# Patient Record
Sex: FEMALE | NOT HISPANIC OR LATINO | Employment: FULL TIME | URBAN - METROPOLITAN AREA
[De-identification: names, ages, dates, MRNs, and addresses within clinical notes are randomized per-mention and may not be internally consistent; named-entity substitution may affect disease eponyms.]

---

## 2019-10-01 ENCOUNTER — NON-PROVIDER VISIT (OUTPATIENT)
Dept: URGENT CARE | Facility: PHYSICIAN GROUP | Age: 19
End: 2019-10-01

## 2019-10-01 DIAGNOSIS — Z02.1 PRE-EMPLOYMENT DRUG SCREENING: ICD-10-CM

## 2019-10-01 LAB
AMP AMPHETAMINE: NORMAL
COC COCAINE: NORMAL
INT CON NEG: NORMAL
INT CON POS: NORMAL
MET METHAMPHETAMINES: NORMAL
OPI OPIATES: NORMAL
PCP PHENCYCLIDINE: NORMAL
POC DRUG COMMENT 753798-OCCUPATIONAL HEALTH: NEGATIVE
THC: NORMAL

## 2019-10-01 PROCEDURE — 80305 DRUG TEST PRSMV DIR OPT OBS: CPT | Performed by: FAMILY MEDICINE

## 2020-03-11 ENCOUNTER — HOSPITAL ENCOUNTER (EMERGENCY)
Facility: MEDICAL CENTER | Age: 20
End: 2020-03-11
Attending: EMERGENCY MEDICINE
Payer: MEDICAID

## 2020-03-11 VITALS
TEMPERATURE: 98.9 F | HEIGHT: 66 IN | WEIGHT: 209.44 LBS | HEART RATE: 92 BPM | RESPIRATION RATE: 16 BRPM | SYSTOLIC BLOOD PRESSURE: 112 MMHG | OXYGEN SATURATION: 100 % | DIASTOLIC BLOOD PRESSURE: 69 MMHG | BODY MASS INDEX: 33.66 KG/M2

## 2020-03-11 DIAGNOSIS — J06.9 UPPER RESPIRATORY TRACT INFECTION, UNSPECIFIED TYPE: ICD-10-CM

## 2020-03-11 PROCEDURE — 99282 EMERGENCY DEPT VISIT SF MDM: CPT

## 2020-03-11 ASSESSMENT — LIFESTYLE VARIABLES: DO YOU DRINK ALCOHOL: YES

## 2020-03-11 NOTE — ED NOTES
Discharge instructions and work note given to pt. All questions answered and pt verbalized understanding.

## 2020-03-11 NOTE — ED TRIAGE NOTES
Pt amb to triage. Mask applied. RN utilizing mask and eye protection.  Chief Complaint   Patient presents with   • Cough     x2d   • N/V     [id number]

## 2020-03-11 NOTE — ED PROVIDER NOTES
"ED Provider Note      CHIEF COMPLAINT  Chief Complaint   Patient presents with   • Cough     x2d   • N/V     [id number]       HPI  Bhupinder Bledsoe is a 20 y.o. female who presents with cough, congestion, runny nose. Symptoms started 2 days ago.  No fever or sore throat.  Felt nauseated this morning.  Vomited one time.  No dysuria hematuria frequency.  No abdominal pain.  No rash.  No headache neck stiffness or fevers.  Similar symptoms in the past from allergies.    No travel out of [facility].  No known ill exposure.    REVIEW OF SYSTEMS  See HPI    PAST MEDICAL HISTORY  Past Medical History:   Diagnosis Date   • ADHD (attention deficit hyperactivity disorder)    • Bipolar 1 disorder (HCC)        FAMILY HISTORY  No family history on file.    SOCIAL HISTORY  Social History     Tobacco Use   • Smoking status: Never Smoker   Substance Use Topics   • Alcohol use: No   • Drug use: No       SURGICAL HISTORY  Past Surgical History:   Procedure Laterality Date   • TURBINATE REDUCTION  1/28/2011    Performed by PERRY DUARTE at [facility]   • TONSILLECTOMY AND ADENOIDECTOMY  2/13/2009    Performed by PERRY DUARTE at [facility]   • MYRINGOTOMY     • PB REMOVAL OF TONSILS,<12 Y/O         CURRENT MEDICATIONS  Home Medications     Reviewed by Temi Buitrago R.N. (Registered Nurse) on 03/11/20 at 1138  Med List Status: Partial   Medication Last Dose Status   CLONIDINE HCL PO  Active   fluoxetine (PROZAC) 40 MG capsule  Active   lamoTRIgine (LAMICTAL PO)  Active   methylphenidate (RITALIN) 5 MG TABS  Active   paliperidone (INVEGA) 3 MG ER tablet not taking Active                ALLERGIES  No Known Allergies    PHYSICAL EXAM  VITAL SIGNS: /69   Pulse 92   Temp 37.2 °C (98.9 °F) (Temporal)   Resp 16   Ht 1.676 m (5' 6\")   Wt 95 kg (209 lb 7 oz)   LMP 02/17/2020   SpO2 100%   BMI 33.80 kg/m²   Constitutional :  No acute distress, Non-toxic appearance.   HENT: " Head atraumatic, nares clear, pharynx normal, tube in the left tympanic membrane without evidence of infection.  Right tympanic membrane with scars but no evidence of infection.  Eyes: Normal inspection, no discharge, no injection  Neck: Normal range of motion, No tenderness, Supple, No stridor.   Lymphatic: no lymphadenopathy.   Cardiovascular: Normal heart rate, Normal rhythm, No murmurs   Thorax & Lungs: Lungs are clear to auscultation bilaterally without wheezes, rales, rhonchi  Skin: Warm, Dry, No erythema, No rash.   Extremities: No edema, No tenderness, No cyanosis, No clubbing.       COURSE & MEDICAL DECISION MAKING  Patient presents with URI symptoms.  Advised symptomatic care.  Thinks possibly related to allergies and have advised Zyrtec.  Can return to work when her symptoms resolved.  Return to ER for difficulty breathing, worsening, not improving or concern.    FINAL IMPRESSION  1.  Upper respiratory infection    This dictation was created using voice recognition software. The accuracy of the dictation is limited to the abilities of the software.   the nursing notes were reviewed and certain aspects of this information were incorporated into this note.      Electronically signed by: Tony Hinton M.D., 3/11/2020

## 2020-04-07 ENCOUNTER — HOSPITAL ENCOUNTER (EMERGENCY)
Dept: HOSPITAL 8 - ED | Age: 20
End: 2020-04-07
Payer: MEDICAID

## 2020-04-07 VITALS — WEIGHT: 205.03 LBS | HEIGHT: 66 IN | BODY MASS INDEX: 32.95 KG/M2

## 2020-04-07 VITALS — SYSTOLIC BLOOD PRESSURE: 131 MMHG | DIASTOLIC BLOOD PRESSURE: 75 MMHG

## 2020-04-07 DIAGNOSIS — J06.9: Primary | ICD-10-CM

## 2020-04-07 PROCEDURE — 99283 EMERGENCY DEPT VISIT LOW MDM: CPT

## 2020-06-17 ENCOUNTER — HOSPITAL ENCOUNTER (EMERGENCY)
Dept: HOSPITAL 8 - ED | Age: 20
Discharge: HOME | End: 2020-06-17
Payer: MEDICAID

## 2020-06-17 VITALS — SYSTOLIC BLOOD PRESSURE: 109 MMHG | DIASTOLIC BLOOD PRESSURE: 59 MMHG

## 2020-06-17 VITALS — BODY MASS INDEX: 34.65 KG/M2 | HEIGHT: 66 IN | WEIGHT: 215.61 LBS

## 2020-06-17 DIAGNOSIS — R10.84: Primary | ICD-10-CM

## 2020-06-17 PROCEDURE — 99281 EMR DPT VST MAYX REQ PHY/QHP: CPT

## 2020-10-10 ENCOUNTER — HOSPITAL ENCOUNTER (EMERGENCY)
Dept: HOSPITAL 8 - ED | Age: 20
Discharge: HOME | End: 2020-10-10
Payer: MEDICAID

## 2020-10-10 VITALS — DIASTOLIC BLOOD PRESSURE: 61 MMHG | SYSTOLIC BLOOD PRESSURE: 111 MMHG

## 2020-10-10 VITALS — HEIGHT: 66 IN | BODY MASS INDEX: 33.16 KG/M2 | WEIGHT: 206.35 LBS

## 2020-10-10 DIAGNOSIS — Z3A.01: ICD-10-CM

## 2020-10-10 DIAGNOSIS — R10.30: ICD-10-CM

## 2020-10-10 DIAGNOSIS — O23.11: Primary | ICD-10-CM

## 2020-10-10 LAB
ALBUMIN SERPL-MCNC: 3.6 G/DL (ref 3.4–5)
ALP SERPL-CCNC: 62 U/L (ref 45–117)
ALT SERPL-CCNC: 19 U/L (ref 12–78)
ANION GAP SERPL CALC-SCNC: 4 MMOL/L (ref 5–15)
BASOPHILS # BLD AUTO: 0.1 X10^3/UL (ref 0–0.1)
BASOPHILS NFR BLD AUTO: 1 % (ref 0–1)
BILIRUB SERPL-MCNC: 0.3 MG/DL (ref 0.2–1)
CALCIUM SERPL-MCNC: 8.7 MG/DL (ref 8.5–10.1)
CHLORIDE SERPL-SCNC: 108 MMOL/L (ref 98–107)
CREAT SERPL-MCNC: 0.73 MG/DL (ref 0.55–1.02)
EOSINOPHIL # BLD AUTO: 0.1 X10^3/UL (ref 0–0.4)
EOSINOPHIL NFR BLD AUTO: 1 % (ref 1–7)
ERYTHROCYTE [DISTWIDTH] IN BLOOD BY AUTOMATED COUNT: 13 % (ref 9.6–15.2)
LYMPHOCYTES # BLD AUTO: 2.6 X10^3/UL (ref 1–3.4)
LYMPHOCYTES NFR BLD AUTO: 28 % (ref 22–44)
MCH RBC QN AUTO: 28.6 PG (ref 27–34.8)
MCHC RBC AUTO-ENTMCNC: 33.8 G/DL (ref 32.4–35.8)
MD: NO
MICROSCOPIC: (no result)
MONOCYTES # BLD AUTO: 0.6 X10^3/UL (ref 0.2–0.8)
MONOCYTES NFR BLD AUTO: 7 % (ref 2–9)
NEUTROPHILS # BLD AUTO: 6.1 X10^3/UL (ref 1.8–6.8)
NEUTROPHILS NFR BLD AUTO: 64 % (ref 42–75)
PLATELET # BLD AUTO: 275 X10^3/UL (ref 130–400)
PMV BLD AUTO: 9.2 FL (ref 7.4–10.4)
PROT SERPL-MCNC: 7.4 G/DL (ref 6.4–8.2)
RBC # BLD AUTO: 5.19 X10^6/UL (ref 3.82–5.3)

## 2020-10-10 PROCEDURE — 80053 COMPREHEN METABOLIC PANEL: CPT

## 2020-10-10 PROCEDURE — 84702 CHORIONIC GONADOTROPIN TEST: CPT

## 2020-10-10 PROCEDURE — 99284 EMERGENCY DEPT VISIT MOD MDM: CPT

## 2020-10-10 PROCEDURE — 85025 COMPLETE CBC W/AUTO DIFF WBC: CPT

## 2020-10-10 PROCEDURE — 36415 COLL VENOUS BLD VENIPUNCTURE: CPT

## 2020-10-10 PROCEDURE — 81001 URINALYSIS AUTO W/SCOPE: CPT

## 2020-10-10 PROCEDURE — 87086 URINE CULTURE/COLONY COUNT: CPT

## 2020-10-10 PROCEDURE — 76801 OB US < 14 WKS SINGLE FETUS: CPT

## 2020-10-16 ENCOUNTER — HOSPITAL ENCOUNTER (EMERGENCY)
Dept: HOSPITAL 8 - ED | Age: 20
Discharge: HOME | End: 2020-10-16
Payer: MEDICAID

## 2020-10-16 VITALS — BODY MASS INDEX: 31.89 KG/M2 | HEIGHT: 66 IN | WEIGHT: 198.42 LBS

## 2020-10-16 VITALS — SYSTOLIC BLOOD PRESSURE: 109 MMHG | DIASTOLIC BLOOD PRESSURE: 61 MMHG

## 2020-10-16 DIAGNOSIS — O21.1: Primary | ICD-10-CM

## 2020-10-16 DIAGNOSIS — R19.7: ICD-10-CM

## 2020-10-16 DIAGNOSIS — Z3A.01: ICD-10-CM

## 2020-10-16 LAB
ALBUMIN SERPL-MCNC: 3.9 G/DL (ref 3.4–5)
ALP SERPL-CCNC: 64 U/L (ref 45–117)
ALT SERPL-CCNC: 24 U/L (ref 12–78)
ANION GAP SERPL CALC-SCNC: 9 MMOL/L (ref 5–15)
BASOPHILS # BLD AUTO: 0 X10^3/UL (ref 0–0.1)
BASOPHILS NFR BLD AUTO: 1 % (ref 0–1)
BILIRUB SERPL-MCNC: 0.9 MG/DL (ref 0.2–1)
CALCIUM SERPL-MCNC: 9.3 MG/DL (ref 8.5–10.1)
CHLORIDE SERPL-SCNC: 108 MMOL/L (ref 98–107)
CREAT SERPL-MCNC: 0.75 MG/DL (ref 0.55–1.02)
EOSINOPHIL # BLD AUTO: 0 X10^3/UL (ref 0–0.4)
EOSINOPHIL NFR BLD AUTO: 1 % (ref 1–7)
ERYTHROCYTE [DISTWIDTH] IN BLOOD BY AUTOMATED COUNT: 13 % (ref 9.6–15.2)
LYMPHOCYTES # BLD AUTO: 2.5 X10^3/UL (ref 1–3.4)
LYMPHOCYTES NFR BLD AUTO: 28 % (ref 22–44)
MCH RBC QN AUTO: 28.5 PG (ref 27–34.8)
MCHC RBC AUTO-ENTMCNC: 34.3 G/DL (ref 32.4–35.8)
MD: NO
MONOCYTES # BLD AUTO: 0.5 X10^3/UL (ref 0.2–0.8)
MONOCYTES NFR BLD AUTO: 5 % (ref 2–9)
NEUTROPHILS # BLD AUTO: 6 X10^3/UL (ref 1.8–6.8)
NEUTROPHILS NFR BLD AUTO: 66 % (ref 42–75)
PLATELET # BLD AUTO: 284 X10^3/UL (ref 130–400)
PMV BLD AUTO: 9.7 FL (ref 7.4–10.4)
PROT SERPL-MCNC: 8.1 G/DL (ref 6.4–8.2)
RBC # BLD AUTO: 5.73 X10^6/UL (ref 3.82–5.3)

## 2020-10-16 PROCEDURE — 80053 COMPREHEN METABOLIC PANEL: CPT

## 2020-10-16 PROCEDURE — 99283 EMERGENCY DEPT VISIT LOW MDM: CPT

## 2020-10-16 PROCEDURE — 85025 COMPLETE CBC W/AUTO DIFF WBC: CPT

## 2020-10-16 PROCEDURE — 96360 HYDRATION IV INFUSION INIT: CPT

## 2020-10-16 PROCEDURE — 96372 THER/PROPH/DIAG INJ SC/IM: CPT

## 2020-10-16 PROCEDURE — 36415 COLL VENOUS BLD VENIPUNCTURE: CPT

## 2020-10-16 PROCEDURE — 83690 ASSAY OF LIPASE: CPT

## 2020-10-16 NOTE — NUR
PT CAME IN CO OF VOMITTING AND NAUSEA X 2-3 DAYS. PT STATES SHE IS ABOUT 6 
WEEKS PREGANT. DENIES VB OR DISCHAGRE OF ABD PAIN. UA HAS BEEN PROVIDED. PT IS 
RESTING IN [facility]. ACCMPANIED BY BOYFRIEND.

## 2023-04-21 ENCOUNTER — OCCUPATIONAL MEDICINE (OUTPATIENT)
Dept: URGENT CARE | Facility: CLINIC | Age: 23
End: 2023-04-21
Payer: COMMERCIAL

## 2023-04-21 VITALS
HEART RATE: 87 BPM | WEIGHT: 204.5 LBS | BODY MASS INDEX: 32.87 KG/M2 | HEIGHT: 66 IN | DIASTOLIC BLOOD PRESSURE: 72 MMHG | OXYGEN SATURATION: 99 % | SYSTOLIC BLOOD PRESSURE: 112 MMHG | TEMPERATURE: 98.3 F | RESPIRATION RATE: 16 BRPM

## 2023-04-21 DIAGNOSIS — S63.501D SPRAIN OF RIGHT WRIST, SUBSEQUENT ENCOUNTER: ICD-10-CM

## 2023-04-21 PROCEDURE — 99203 OFFICE O/P NEW LOW 30 MIN: CPT | Performed by: NURSE PRACTITIONER

## 2023-04-21 ASSESSMENT — ENCOUNTER SYMPTOMS
SENSORY CHANGE: 0
WEAKNESS: 0

## 2023-04-21 NOTE — PATIENT INSTRUCTIONS
-NSAID's (ibuprofen) and tylenol as directed for pain and inflammation.   -RICE Therapy: Rest, Ice, Compression, Elevation  -Gentle range of motion exercises.  -Wear right wrist splint as needed.   -Follow up in 1 week.

## 2023-04-21 NOTE — LETTER
[address] [place] [address]  Phone:  [phone] - Fax:  [phone]   [facility] Progress Report and Disability Certification  Date of Service: 4/21/2023   No Show:  No  Date / Time of Next Visit: 04/29/2023   Claim Information   Patient Name: Bhupinder Bledsoe  Claim Number:     Employer: [employer]  Date of Injury: 4/12/2023     Insurer / TPA: [facility]  ID / SSN:     Occupation:   Diagnosis: The encounter diagnosis was Sprain of right wrist, subsequent encounter.    Medical Information   Related to Industrial Injury? Yes    Subjective Complaints:  DOI: 4/12/2022. Patient reports she was twisting the handle on a pallet petra when the handle snapped. She felt a pop on the radial aspect of her right wrist at the time. Reports improvement in symptoms. Pain 0/10 at rest, stating she has not had pain the last 2 days. Denies numbness. No weakness. Was seen at [facility] on 4/15. Had an x-ray, and was told she sprained the wrist. Had been taking ibuprofen, and using the wrist splint as directed.      Objective Findings: Physical Exam  Vitals reviewed.   Pulmonary:      Effort: Pulmonary effort is normal. No respiratory distress.   Musculoskeletal:         General: No swelling, tenderness, deformity or signs of injury. Normal range of motion.      Right wrist: No swelling, deformity, tenderness, bony tenderness, snuff box tenderness or crepitus. Normal range of motion. Normal pulse.   Skin:     General: Skin is warm and dry.      Capillary Refill: Capillary refill takes less than 2 seconds.   Neurological:      Mental Status: She is alert and oriented to person, place, and time.      Pre-Existing Condition(s): None reported.    Assessment:   Condition Improved    Status: Additional Care Required  Permanent Disability:No    Plan: Medication    Diagnostics:      Comments:       Disability  Information   Status: Released to Full Duty  Comments:May use wrist splint as needed.    From:  4/21/2023  Through: 4/28/2023 Restrictions are:     Physical Restrictions   Sitting:    Standing:    Stooping:    Bending:      Squatting:    Walking:    Climbing:    Pushing:      Pulling:    Other:    Reaching Above Shoulder (L):   Reaching Above Shoulder (R):       Reaching Below Shoulder (L):    Reaching Below Shoulder (R):      Not to exceed Weight Limits   Carrying(hrs):   Weight Limit(lb):   Lifting(hrs):   Weight  Limit(lb):     Comments: -NSAID's (ibuprofen) and tylenol as directed for pain and inflammation.   -RICE Therapy: Rest, Ice, Compression, Elevation  -Gentle range of motion exercises.  -Wear right wrist splint as needed.   -Follow up in 1 week.     Repetitive Actions   Hands: i.e. Fine Manipulations from Grasping:     Feet: i.e. Operating Foot Controls:     Driving / Operate Machinery:     Health Care Provider’s Original or Electronic Signature  CONCETTA Quesada Health Care Provider’s Original or Electronic Signature    Merlin Morales DO MPH     Clinic Name / Location: [address] [place] [address] Clinic Phone Number: Dept: [phone]   Appointment Time: 9:15 Am Visit Start Time: 10:21 AM   Check-In Time:  9:39 Am Visit Discharge Time: 11:24  AM    Original-Treating Physician or Chiropractor    Page 2-Insurer/TPA    Page 3-Employer    Page 4-Employee

## 2023-04-21 NOTE — PROGRESS NOTES
"Subjective     Bhupinder Bledsoe is a 23 y.o. female who presents with Wrist Injury (NEW  DOI 4/12/23 L WRIST SPRAIN.  WENT TO [facility] AND WAS DIAGNOSED WITH A \"MAJOR SPRAIN\".  THE PATIENT WOULD LIKE TO RETURN TO WORK)            DOI: 4/12/2022. Patient reports she was twisting the handle on a pallet petra when the handle snapped. She felt a pop on the radial aspect of her right wrist at the time. Reports improvement in symptoms. Pain 0/10 at rest, stating she has not had pain the last 2 days. Denies numbness. No weakness. Was seen at [facility] on 4/15. Had an x-ray, and was told she sprained the wrist. Had been taking ibuprofen, and using the wrist splint as directed.     Wrist Injury       Review of Systems   Musculoskeletal:  Positive for joint pain.   Neurological:  Negative for sensory change and weakness.   All other systems reviewed and are negative.           Objective     /72 (BP Location: Right arm, Patient Position: Sitting, BP Cuff Size: Adult)   Pulse 87   Temp 36.8 °C (98.3 °F) (Temporal)   Resp 16   Ht 1.676 m (5' 6\")   Wt 92.8 kg (204 lb 8 oz)   SpO2 99%   BMI 33.01 kg/m²      Physical Exam  Vitals reviewed.   Pulmonary:      Effort: Pulmonary effort is normal. No respiratory distress.   Musculoskeletal:         General: No swelling, tenderness, deformity or signs of injury. Normal range of motion.      Right wrist: No swelling, deformity, tenderness, bony tenderness, snuff box tenderness or crepitus. Normal range of motion. Normal pulse.      Comments:  5/5.   Skin:     General: Skin is warm and dry.      Capillary Refill: Capillary refill takes less than 2 seconds.   Neurological:      Mental Status: She is alert and oriented to person, place, and time.                           Assessment & Plan     1. Sprain of right wrist, subsequent encounter    -NSAID's (ibuprofen) and tylenol as directed for pain and inflammation.   -RICE Therapy: Rest, Ice, Compression, " Elevation  -Gentle range of motion exercises.  -Wear right wrist splint as needed.   -Follow up in 1 week.     C-4 was completed in the ED. Patient has not returned back to work. No pain or deficit on exam. Discussed trailing return to work full duty, with use of splint PRN, with f/u in 1 week.

## 2023-04-29 ENCOUNTER — OCCUPATIONAL MEDICINE (OUTPATIENT)
Dept: URGENT CARE | Facility: CLINIC | Age: 23
End: 2023-04-29
Payer: COMMERCIAL

## 2023-04-29 VITALS
HEIGHT: 66 IN | DIASTOLIC BLOOD PRESSURE: 78 MMHG | WEIGHT: 204 LBS | OXYGEN SATURATION: 98 % | RESPIRATION RATE: 16 BRPM | BODY MASS INDEX: 32.78 KG/M2 | TEMPERATURE: 97.5 F | HEART RATE: 77 BPM | SYSTOLIC BLOOD PRESSURE: 100 MMHG

## 2023-04-29 DIAGNOSIS — S63.501D SPRAIN OF RIGHT WRIST, SUBSEQUENT ENCOUNTER: ICD-10-CM

## 2023-04-29 PROCEDURE — 99213 OFFICE O/P EST LOW 20 MIN: CPT | Performed by: NURSE PRACTITIONER

## 2023-04-29 ASSESSMENT — ENCOUNTER SYMPTOMS
SENSORY CHANGE: 0
TINGLING: 0
MYALGIAS: 0
FOCAL WEAKNESS: 0

## 2023-04-29 NOTE — LETTER
[address] [place] [address]  Phone:  [phone] - Fax:  [phone]   [facility] Progress Report and Disability Certification  Date of Service: 4/29/2023   No Show:  No  Date / Time of Next Visit:     Claim Information   Patient Name: Bhupinder Bledsoe  Claim Number:     Employer: [employer]  Date of Injury: 4/12/2023     Insurer / TPA: [facility]  ID / SSN:     Occupation:   Diagnosis: The encounter diagnosis was Sprain of right wrist, subsequent encounter.    Medical Information   Related to Industrial Injury? Yes    Subjective Complaints:  DOI: 4/12/23. Patient is 23 year old female here for follow up on right wrist strain. Tolerating full duty with no pain and no intervention other than stretching.    Objective Findings: Physical Exam  Constitutional:       Appearance: Normal appearance.   Musculoskeletal:         General: Normal range of motion.   Skin:     General: Skin is warm and dry.   Neurological:      General: No focal deficit present.      Mental Status: She is alert and oriented to person, place, and time.      Motor: No weakness.   Psychiatric:         Mood and Affect: Mood normal.      Pre-Existing Condition(s):     Assessment:   Condition Improved    Status: Discharged /  MMI  Permanent Disability:No    Plan:      Diagnostics:      Comments:       Disability Information   Status: Released to Full Duty    From:     Through:   Restrictions are:     Physical Restrictions   Sitting:    Standing:    Stooping:    Bending:      Squatting:    Walking:    Climbing:    Pushing:      Pulling:    Other:    Reaching Above Shoulder (L):   Reaching Above Shoulder (R):       Reaching Below Shoulder (L):    Reaching Below Shoulder (R):      Not to exceed Weight Limits   Carrying(hrs):   Weight Limit(lb):   Lifting(hrs):   Weight  Limit(lb):     Comments:      Repetitive Actions   Hands: i.e. Fine  Manipulations from Grasping:     Feet: i.e. Operating Foot Controls:     Driving / Operate Machinery:     Health Care Provider’s Original or Electronic Signature  HILARY MarquezPBernardoN. Health Care Provider’s Original or Electronic Signature    Merlin Morales DO MPH     Clinic Name / Location: [place] [address]  [place] [address] Clinic Phone Number: Dept: [phone]   Appointment Time: 10:00 Am Visit Start Time: 10:28 AM   Check-In Time:  9:51 Am Visit Discharge Time:     Original-Treating Physician or Chiropractor    Page 2-Insurer/TPA    Page 3-Employer    Page 4-Employee

## 2023-04-29 NOTE — PROGRESS NOTES
"Subjective     Bhupinder Bledsoe is a 23 y.o. female who presents with Follow-Up (W/C follow-up, improvement with injury)            HPIDOI: 4/12/23. Patient is 23 year old female here for follow up on right wrist strain. Tolerating full duty with no pain and no intervention other than stretching.     Patient has no known allergies.  No current outpatient medications on file prior to visit.     No current facility-administered medications on file prior to visit.     Social History     Socioeconomic History    Marital status: Single     Spouse name: Not on file    Number of children: Not on file    Years of education: Not on file    Highest education level: Not on file   Occupational History    Not on file   Tobacco Use    Smoking status: Never    Smokeless tobacco: Not on file   Vaping Use    Vaping Use: Never used   Substance and Sexual Activity    Alcohol use: Yes     Comment: socially    Drug use: No    Sexual activity: Yes     Partners: Male     Birth control/protection: Implant   Other Topics Concern    Not on file   Social History Narrative    ** Merged History Encounter **          Social Determinants of Health     Financial Resource Strain: Not on file   Food Insecurity: Not on file   Transportation Needs: Not on file   Physical Activity: Not on file   Stress: Not on file   Social Connections: Not on file   Intimate Partner Violence: Not on file   Housing Stability: Not on file     Breast Cancer-related family history is not on file.      Review of Systems   Musculoskeletal:  Negative for joint pain and myalgias.   Skin: Negative.    Neurological:  Negative for tingling, sensory change and focal weakness.            Objective     /78   Pulse 77   Temp 36.4 °C (97.5 °F) (Temporal)   Resp 16   Ht 1.676 m (5' 6\")   Wt 92.5 kg (204 lb)   SpO2 98%   BMI 32.93 kg/m²      Physical Exam  Constitutional:       Appearance: Normal appearance.   Musculoskeletal:         General: Normal range of " motion.   Skin:     General: Skin is warm and dry.   Neurological:      General: No focal deficit present.      Mental Status: She is alert and oriented to person, place, and time.      Motor: No weakness.   Psychiatric:         Mood and Affect: Mood normal.                           Assessment & Plan        1. Sprain of right wrist, subsequent encounter          MMI  Follow up as needed,